# Patient Record
Sex: MALE | NOT HISPANIC OR LATINO | URBAN - METROPOLITAN AREA
[De-identification: names, ages, dates, MRNs, and addresses within clinical notes are randomized per-mention and may not be internally consistent; named-entity substitution may affect disease eponyms.]

---

## 2019-09-30 ENCOUNTER — IMPORTED ENCOUNTER (OUTPATIENT)
Dept: URBAN - METROPOLITAN AREA CLINIC 1 | Facility: CLINIC | Age: 64
End: 2019-09-30

## 2019-09-30 PROBLEM — H43.811: Noted: 2019-09-30

## 2019-09-30 PROBLEM — H01.001: Noted: 2019-09-30

## 2019-09-30 PROBLEM — H01.004: Noted: 2019-09-30

## 2019-09-30 PROBLEM — H25.13: Noted: 2019-09-30

## 2019-09-30 PROBLEM — H40.023: Noted: 2019-09-30

## 2019-09-30 PROBLEM — G43.909: Noted: 2019-09-30

## 2019-09-30 PROCEDURE — 92004 COMPRE OPH EXAM NEW PT 1/>: CPT

## 2019-09-30 PROCEDURE — 92250 FUNDUS PHOTOGRAPHY W/I&R: CPT

## 2019-09-30 PROCEDURE — 92015 DETERMINE REFRACTIVE STATE: CPT

## 2019-09-30 NOTE — PATIENT DISCUSSION
1.  Ocular Migraine -- Reassurance and explanation was given to patient. 2. PVD w/o Tear OD -- Condition discussed. No holes or tears found with today's dilated exam. RD precautions given. 3.  Glaucoma Suspect OU -- (CD 0.8/0.7) IOP stable. Negative Family Hx. Patient is considered high risk. Disc photo today showing Cupping OU. Condition was discussed with patient and patient understands. Will have patient return for baseline OCT in 6 months. 4. Anterior Blepharitis OU -- Daily Hot compresses and lid scrubs were recommended. 5. Nuclear Cataract OU -- Observe for now without intervention. The patient was advised to contact us if any change or worsening of visionReturn for an appointment in 6 months for a 10/OCT with Dr. Belle Wheatley.

## 2020-07-21 ENCOUNTER — IMPORTED ENCOUNTER (OUTPATIENT)
Dept: URBAN - METROPOLITAN AREA CLINIC 1 | Facility: CLINIC | Age: 65
End: 2020-07-21

## 2020-07-21 PROBLEM — H43.811: Noted: 2020-07-21

## 2020-07-21 PROBLEM — H40.013: Noted: 2020-07-21

## 2020-07-21 PROBLEM — H04.123: Noted: 2020-07-21

## 2020-07-21 PROBLEM — H25.813: Noted: 2020-07-21

## 2020-07-21 PROBLEM — H01.004: Noted: 2020-07-21

## 2020-07-21 PROBLEM — H16.143: Noted: 2020-07-21

## 2020-07-21 PROBLEM — H01.001: Noted: 2020-07-21

## 2020-07-21 PROCEDURE — 92014 COMPRE OPH EXAM EST PT 1/>: CPT

## 2020-07-21 PROCEDURE — 92133 CPTRZD OPH DX IMG PST SGM ON: CPT

## 2020-07-21 PROCEDURE — 92015 DETERMINE REFRACTIVE STATE: CPT

## 2020-07-21 NOTE — PATIENT DISCUSSION
1.  Glaucoma Suspect OU (CD 0.80/0.70): OCT essentially WNL OU. IOP stable. Negative family hx. Patient is considered Low Risk. Condition was discussed with patient and patient understands. Will continue to monitor patient for any progression in condition. Patient was advised to call us with any problems questions or concerns. 2.  MISTY w/ PEK OU- Advised to DC Visine. Recommend PF ATs QID OU routinely and AT Gel QHS OU 3. Cataract OU: Observe for now without intervention. The patient was advised to contact us if any change or worsening of vision4. Anterior Blepharitis OU - Daily Hot compresses and lid scrubs were recommended. 5. PVD w/o Tear OD - RD precautions. 6.  H/o Ocular Migraine 7. H/o Bell's Palsy  MRX for glasses given. Return for an appointment in 1 year 30/OCT with Dr. Beatrice Lewis.

## 2021-01-05 ENCOUNTER — IMPORTED ENCOUNTER (OUTPATIENT)
Dept: URBAN - METROPOLITAN AREA CLINIC 1 | Facility: CLINIC | Age: 66
End: 2021-01-05

## 2021-01-05 PROBLEM — H01.002: Noted: 2021-01-05

## 2021-01-05 PROBLEM — H01.005: Noted: 2021-01-05

## 2021-01-05 PROBLEM — H00.15: Noted: 2021-01-05

## 2021-01-05 PROBLEM — H01.001: Noted: 2021-01-05

## 2021-01-05 PROBLEM — H01.004: Noted: 2021-01-05

## 2021-01-05 PROCEDURE — 92012 INTRM OPH EXAM EST PATIENT: CPT

## 2021-01-05 NOTE — PATIENT DISCUSSION
1.  Blepharitis OU w/ cellulitis OS-recommend hot compresses TID for 5 minutes until seen. Begin doxycycline  mg BID (dispensed #60 erx'd)2. MISTY w/ PEK OU- Recommend cont PF ATs QID OU routinely and AT Gel QHS OU 3. Cataract OU: Observe for now without intervention. The patient was advised to contact us if any change or worsening of vision4. H/o Glaucoma Suspect (CD 0.80/0.70): IOP stable. Negative family hx. Patient is considered Low Risk. 5. H/o PVD w/o Tear OD 6. H/o Ocular Migraine 7. H/o Bell's Palsy Return for an appointment in 2 weeks 10 with Dr. Gelacio Mahoney.

## 2021-01-22 ENCOUNTER — IMPORTED ENCOUNTER (OUTPATIENT)
Dept: URBAN - METROPOLITAN AREA CLINIC 1 | Facility: CLINIC | Age: 66
End: 2021-01-22

## 2021-01-22 PROBLEM — H01.001: Noted: 2021-01-22

## 2021-01-22 PROBLEM — H01.004: Noted: 2021-01-22

## 2021-01-22 PROCEDURE — 92012 INTRM OPH EXAM EST PATIENT: CPT

## 2021-01-22 NOTE — PATIENT DISCUSSION
1.  Blepharitis OU w/ Cellulitis OS -- Much improved. D/c doxycycline. Cont Baby shampoo Qdaily and Hot compresses 2. MISTY w/ PEK OU- Recommend cont PF ATs QID OU routinely and AT Gel QHS OU 3. Cataract OU-  Observe for now without intervention. The patient was advised to contact us if any change or worsening of vision4. H/o Glaucoma Suspect (CD 0.80/0.70): IOP stable. Negative family hx. Patient is considered Low Risk. 5. H/o PVD w/o Tear OD 6. H/o Ocular Migraine 7. H/o Bell's Palsy Return for an appointment in As schedule with Dr. Brynn Kay.

## 2021-07-22 ENCOUNTER — IMPORTED ENCOUNTER (OUTPATIENT)
Dept: URBAN - METROPOLITAN AREA CLINIC 1 | Facility: CLINIC | Age: 66
End: 2021-07-22

## 2021-07-22 PROBLEM — H01.021: Noted: 2021-07-22

## 2021-07-22 PROBLEM — H16.143: Noted: 2021-07-22

## 2021-07-22 PROBLEM — H01.024: Noted: 2021-07-22

## 2021-07-22 PROBLEM — H04.123: Noted: 2021-07-22

## 2021-07-22 PROBLEM — H40.013: Noted: 2021-07-22

## 2021-07-22 PROBLEM — H01.001: Noted: 2021-07-22

## 2021-07-22 PROBLEM — H01.004: Noted: 2021-07-22

## 2021-07-22 PROBLEM — H25.813: Noted: 2021-07-22

## 2021-07-22 PROCEDURE — 92014 COMPRE OPH EXAM EST PT 1/>: CPT

## 2021-07-22 PROCEDURE — 92133 CPTRZD OPH DX IMG PST SGM ON: CPT

## 2021-07-22 NOTE — PATIENT DISCUSSION
1. Glaucoma Suspect OU (CD 0.8 0.75) IOP stable. OCT today WNL OU. Likely physiological cupping. Negative Family Hx. Patient is considered Low Risk. Condition was discussed with patient and patient understands. Will continue to monitor patient for any progression in condition. Patient was advised to call us with any problems questions or concerns. 2.  Cataract OU --  Observe for now without intervention. Will plan Glare testing OU next appt given patient is noticing glare at night. Patient to contact with any worsening of vision. 3.  Anterior Blepharitis OU -- Cont lid hygiene. Baby shampoo Qdaily and Hot compresses. 4. MISTY w/ PEK OU- Recommend cont PF ATs QID OU routinely and AT Gel QHS OU 5. PVD w/o Tear OD 6. H/o Ocular Migraine 7. H/o Bell's Palsy Return for an appointment 1 year for a 30/glare with Dr. Daphne Golden.

## 2022-01-11 NOTE — PATIENT DISCUSSION
PT CAN'T TOLERATE TAKING LATANOPROST QHS OU DUE TO UNCOMFORTABLE AND BURNING.  DISCUSED OPTION OF SLT WITH PT.  PT DESIRES SLT TO LOWER IOP INSTEAD OF DROP USE. [facility] & [facility] SLT OU.

## 2022-02-21 NOTE — PATIENT DISCUSSION
PT CAN'T TOLERATE TAKING LATANOPROST QHS OU DUE TO UNCOMFORTABLE AND BURNING.  DISCUSED OPTION OF SLT WITH PT.  PT DESIRES SLT TO LOWER IOP INSTEAD OF DROP USE. [address] SLT OU.

## 2022-02-21 NOTE — PROCEDURE NOTE: CLINICAL
PROCEDURE NOTE: SLT OU. Diagnosis: Glaucoma Suspect, Low Risk. Anesthesia: Topical. Prior to treatment, the risks/benefits/alternatives were discussed. The patient wished to proceed with procedure. Lens:  SLT laser lens with goniosol. Power: *mJ. Total applications: *. Application * degrees. Patient tolerated procedure well. There were no complications. Post-op instructions given. Post-op IOP = * mmHg. Edgard Lovell

## 2022-02-28 NOTE — PATIENT DISCUSSION
PT CAN'T TOLERATE TAKING LATANOPROST QHS OU DUE TO UNCOMFORTABLE AND BURNING.  DISCUSED OPTION OF SLT WITH PT.  PT DESIRES SLT TO LOWER IOP INSTEAD OF DROP USE. [facility] & [facility] SLT OU.

## 2022-03-04 NOTE — PATIENT DISCUSSION
PT CAN'T TOLERATE TAKING LATANOPROST QHS OU DUE TO UNCOMFORTABLE AND BURNING.  DISCUSED OPTION OF SLT WITH PT.  PT DESIRES SLT TO LOWER IOP INSTEAD OF DROP USE. [facility] & [facility] SLT OU.

## 2022-04-02 ASSESSMENT — VISUAL ACUITY
OS_SC: 20/20
OD_SC: 20/20-1
OD_CC: J1
OS_CC: J1-2
OS_CC: J1
OD_CC: J1-2
OS_SC: 20/20-1
OD_SC: 20/25+2
OD_SC: 20/25
OS_SC: 20/20
OS_SC: 20/20-1
OD_SC: 20/25
OD_SC: 20/25
OS_SC: 20/20-2

## 2022-04-02 ASSESSMENT — TONOMETRY
OD_IOP_MMHG: 17
OS_IOP_MMHG: 17
OS_IOP_MMHG: 16
OD_IOP_MMHG: 16
OS_IOP_MMHG: 17
OD_IOP_MMHG: 17
OD_IOP_MMHG: 16
OD_IOP_MMHG: 17
OS_IOP_MMHG: 16
OS_IOP_MMHG: 17

## 2022-07-07 NOTE — PATIENT DISCUSSION
PT CAN'T TOLERATE TAKING LATANOPROST QHS OU DUE TO UNCOMFORTABLE AND BURNING.  DISCUSED OPTION OF SLT WITH PT.  PT DESIRES SLT TO LOWER IOP INSTEAD OF DROP USE. [facility] & [facility] SLT OU.

## 2022-07-21 ENCOUNTER — COMPREHENSIVE EXAM (OUTPATIENT)
Dept: URBAN - METROPOLITAN AREA CLINIC 1 | Facility: CLINIC | Age: 67
End: 2022-07-21

## 2022-07-21 DIAGNOSIS — H04.123: ICD-10-CM

## 2022-07-21 DIAGNOSIS — H40.013: ICD-10-CM

## 2022-07-21 DIAGNOSIS — H01.021: ICD-10-CM

## 2022-07-21 DIAGNOSIS — H25.813: ICD-10-CM

## 2022-07-21 DIAGNOSIS — H16.143: ICD-10-CM

## 2022-07-21 DIAGNOSIS — H01.024: ICD-10-CM

## 2022-07-21 DIAGNOSIS — H43.811: ICD-10-CM

## 2022-07-21 PROCEDURE — 99214 OFFICE O/P EST MOD 30 MIN: CPT

## 2022-07-21 PROCEDURE — 92015 DETERMINE REFRACTIVE STATE: CPT

## 2022-07-21 ASSESSMENT — VISUAL ACUITY
OS_CC: 20/20
OD_CC: 20/20-2
OD_BAT: 20/50
OS_BAT: 20/50

## 2022-07-21 ASSESSMENT — TONOMETRY
OS_IOP_MMHG: 17
OD_IOP_MMHG: 17

## 2022-07-21 NOTE — PATIENT DISCUSSION
(CD 0.80/0.75) IOP stable. Past w/u negative. Patient is considered low risk. Condition was discussed with patient and patient understands. Will continue to monitor patient for any progression in condition. Patient was advised to call us with any problems, questions, or concerns.

## 2022-09-08 ENCOUNTER — PRE-OP/H&P (OUTPATIENT)
Dept: URBAN - METROPOLITAN AREA CLINIC 1 | Facility: CLINIC | Age: 67
End: 2022-09-08

## 2022-09-08 VITALS
SYSTOLIC BLOOD PRESSURE: 118 MMHG | HEIGHT: 73 IN | BODY MASS INDEX: 33.8 KG/M2 | WEIGHT: 255 LBS | DIASTOLIC BLOOD PRESSURE: 80 MMHG | HEART RATE: 68 BPM

## 2022-09-08 DIAGNOSIS — H25.813: ICD-10-CM

## 2022-09-08 PROCEDURE — 99499 UNLISTED E&M SERVICE: CPT

## 2022-09-08 PROCEDURE — 92136 OPHTHALMIC BIOMETRY: CPT

## 2022-09-08 PROCEDURE — 92025 CPTRIZED CORNEAL TOPOGRAPHY: CPT

## 2022-09-08 ASSESSMENT — KERATOMETRY
OD_K2POWER_DIOPTERS: 45.75
OS_AXISANGLE2_DEGREES: 162
OD_AXISANGLE_DEGREES: 042
OS_K1POWER_DIOPTERS: 45.00
OS_K2POWER_DIOPTERS: 45.25
OD_AXISANGLE2_DEGREES: 132
OS_AXISANGLE_DEGREES: 072
OD_K1POWER_DIOPTERS: 45.25

## 2022-09-08 ASSESSMENT — VISUAL ACUITY
OD_CC: 20/20-2
OS_CC: 20/20
OS_CC: J1
OD_BAT: 20/50
OD_CC: J1
OS_BAT: 20/50

## 2022-09-08 ASSESSMENT — TONOMETRY
OS_IOP_MMHG: 17
OD_IOP_MMHG: 17

## 2022-09-21 ENCOUNTER — SURGERY/PROCEDURE (OUTPATIENT)
Dept: URBAN - METROPOLITAN AREA SURGERY 1 | Facility: SURGERY | Age: 67
End: 2022-09-21

## 2022-09-21 DIAGNOSIS — H25.812: ICD-10-CM

## 2022-09-21 PROCEDURE — 66984 XCAPSL CTRC RMVL W/O ECP: CPT

## 2022-09-22 ENCOUNTER — POST-OP (OUTPATIENT)
Dept: URBAN - METROPOLITAN AREA CLINIC 1 | Facility: CLINIC | Age: 67
End: 2022-09-22

## 2022-09-22 DIAGNOSIS — Z96.1: ICD-10-CM

## 2022-09-22 PROCEDURE — 99024 POSTOP FOLLOW-UP VISIT: CPT

## 2022-09-22 ASSESSMENT — KERATOMETRY
OD_K1POWER_DIOPTERS: 45.25
OS_K1POWER_DIOPTERS: 45.00
OD_AXISANGLE2_DEGREES: 132
OD_K2POWER_DIOPTERS: 45.75
OD_AXISANGLE_DEGREES: 042
OS_AXISANGLE2_DEGREES: 162
OS_AXISANGLE_DEGREES: 072
OS_K2POWER_DIOPTERS: 45.25
OS_K2POWER_DIOPTERS: 45.25
OD_K1POWER_DIOPTERS: 45.25
OS_AXISANGLE2_DEGREES: 162
OD_K2POWER_DIOPTERS: 45.75
OS_K1POWER_DIOPTERS: 45.00
OD_AXISANGLE2_DEGREES: 132
OD_AXISANGLE_DEGREES: 042
OS_AXISANGLE_DEGREES: 072

## 2022-09-22 ASSESSMENT — VISUAL ACUITY
OS_SC: 20/60
OS_PH: 20/25

## 2022-09-22 ASSESSMENT — TONOMETRY: OS_IOP_MMHG: 27

## 2022-09-22 NOTE — PATIENT DISCUSSION
Use Pred Combo gtts through completion of PO gtt chart regimen/ Per our instructions given to patient.

## 2022-09-22 NOTE — PATIENT DISCUSSION
POD#1, CE/IOL OS (Standard), doing well. **Myopic Target. *IOP spike @ 27 OS. 1 gtt Combigan instilled prior to leaving.

## 2022-10-11 ENCOUNTER — POST OP/EVAL OF SECOND EYE (OUTPATIENT)
Dept: URBAN - METROPOLITAN AREA CLINIC 1 | Facility: CLINIC | Age: 67
End: 2022-10-11

## 2022-10-11 DIAGNOSIS — H25.811: ICD-10-CM

## 2022-10-11 DIAGNOSIS — Z96.1: ICD-10-CM

## 2022-10-11 PROCEDURE — 99024 POSTOP FOLLOW-UP VISIT: CPT

## 2022-10-11 PROCEDURE — 92025 CPTRIZED CORNEAL TOPOGRAPHY: CPT

## 2022-10-11 PROCEDURE — 92136 OPHTHALMIC BIOMETRY: CPT

## 2022-10-11 ASSESSMENT — KERATOMETRY
OD_K1POWER_DIOPTERS: 44.75
OS_AXISANGLE2_DEGREES: 168
OD_K2POWER_DIOPTERS: 45.00
OD_AXISANGLE2_DEGREES: 110
OD_AXISANGLE_DEGREES: 020
OS_AXISANGLE_DEGREES: 078
OS_K1POWER_DIOPTERS: 45.00
OS_K2POWER_DIOPTERS: 45.25

## 2022-10-11 ASSESSMENT — VISUAL ACUITY
OS_SC: 20/80
OD_SC: 20/250

## 2022-10-11 ASSESSMENT — TONOMETRY
OS_IOP_MMHG: 16
OD_IOP_MMHG: 16

## 2022-10-19 ENCOUNTER — SURGERY/PROCEDURE (OUTPATIENT)
Dept: URBAN - METROPOLITAN AREA SURGERY 1 | Facility: SURGERY | Age: 67
End: 2022-10-19

## 2022-10-19 DIAGNOSIS — H25.811: ICD-10-CM

## 2022-10-19 PROCEDURE — 66984 XCAPSL CTRC RMVL W/O ECP: CPT

## 2022-10-19 ASSESSMENT — KERATOMETRY
OD_K2POWER_DIOPTERS: 45.00
OD_AXISANGLE2_DEGREES: 110
OD_AXISANGLE_DEGREES: 020
OS_AXISANGLE_DEGREES: 078
OS_K2POWER_DIOPTERS: 45.25
OS_K1POWER_DIOPTERS: 45.00
OS_AXISANGLE2_DEGREES: 168
OD_K1POWER_DIOPTERS: 44.75

## 2022-10-20 ENCOUNTER — POST-OP (OUTPATIENT)
Dept: URBAN - METROPOLITAN AREA CLINIC 1 | Facility: CLINIC | Age: 67
End: 2022-10-20

## 2022-10-20 DIAGNOSIS — Z96.1: ICD-10-CM

## 2022-10-20 PROCEDURE — 99024 POSTOP FOLLOW-UP VISIT: CPT

## 2022-10-20 ASSESSMENT — KERATOMETRY
OD_K2POWER_DIOPTERS: 45.00
OD_AXISANGLE_DEGREES: 020
OD_AXISANGLE2_DEGREES: 110
OD_K1POWER_DIOPTERS: 44.75
OS_K1POWER_DIOPTERS: 45.00
OS_AXISANGLE2_DEGREES: 168
OS_K2POWER_DIOPTERS: 45.25
OS_AXISANGLE_DEGREES: 078

## 2022-10-20 ASSESSMENT — VISUAL ACUITY
OS_PH: 20/20
OD_SC: 20/50
OS_SC: 20/50
OD_PH: 20/20

## 2022-10-20 ASSESSMENT — TONOMETRY
OS_IOP_MMHG: 14
OD_IOP_MMHG: 30

## 2022-10-21 ENCOUNTER — POST-OP (OUTPATIENT)
Dept: URBAN - METROPOLITAN AREA CLINIC 1 | Facility: CLINIC | Age: 67
End: 2022-10-21

## 2022-10-21 DIAGNOSIS — Z96.1: ICD-10-CM

## 2022-10-21 PROCEDURE — 99024 POSTOP FOLLOW-UP VISIT: CPT

## 2022-10-21 ASSESSMENT — KERATOMETRY
OD_AXISANGLE_DEGREES: 020
OS_K2POWER_DIOPTERS: 45.25
OD_K1POWER_DIOPTERS: 44.75
OD_K2POWER_DIOPTERS: 45.00
OD_AXISANGLE2_DEGREES: 110
OS_AXISANGLE2_DEGREES: 168
OS_AXISANGLE_DEGREES: 078
OS_K1POWER_DIOPTERS: 45.00

## 2022-10-21 ASSESSMENT — TONOMETRY
OS_IOP_MMHG: 14
OD_IOP_MMHG: 15

## 2022-10-21 ASSESSMENT — VISUAL ACUITY
OS_SC: 20/40+2
OD_SC: 20/25
OS_PH: 20/20

## 2022-11-04 ENCOUNTER — POST-OP (OUTPATIENT)
Dept: URBAN - METROPOLITAN AREA CLINIC 1 | Facility: CLINIC | Age: 67
End: 2022-11-04

## 2022-11-04 DIAGNOSIS — Z96.1: ICD-10-CM

## 2022-11-04 PROCEDURE — 99024 POSTOP FOLLOW-UP VISIT: CPT

## 2022-11-04 ASSESSMENT — KERATOMETRY
OS_AXISANGLE_DEGREES: 078
OD_K2POWER_DIOPTERS: 45.00
OD_K1POWER_DIOPTERS: 44.75
OD_AXISANGLE_DEGREES: 020
OS_K2POWER_DIOPTERS: 45.25
OD_AXISANGLE2_DEGREES: 110
OS_AXISANGLE2_DEGREES: 168
OS_K1POWER_DIOPTERS: 45.00

## 2022-11-04 ASSESSMENT — TONOMETRY
OS_IOP_MMHG: 15
OD_IOP_MMHG: 15

## 2022-11-04 ASSESSMENT — VISUAL ACUITY
OD_SC: 20/40-2
OD_SC: J1
OS_SC: J1+
OS_SC: 20/60

## 2023-06-05 ENCOUNTER — COMPREHENSIVE EXAM (OUTPATIENT)
Dept: URBAN - METROPOLITAN AREA CLINIC 1 | Facility: CLINIC | Age: 68
End: 2023-06-05

## 2023-06-05 DIAGNOSIS — H40.013: ICD-10-CM

## 2023-06-05 DIAGNOSIS — H01.024: ICD-10-CM

## 2023-06-05 DIAGNOSIS — H04.123: ICD-10-CM

## 2023-06-05 DIAGNOSIS — H16.143: ICD-10-CM

## 2023-06-05 DIAGNOSIS — H01.021: ICD-10-CM

## 2023-06-05 DIAGNOSIS — H43.811: ICD-10-CM

## 2023-06-05 PROCEDURE — 92014 COMPRE OPH EXAM EST PT 1/>: CPT

## 2023-06-05 PROCEDURE — 92133 CPTRZD OPH DX IMG PST SGM ON: CPT

## 2023-06-05 ASSESSMENT — KERATOMETRY
OS_K1POWER_DIOPTERS: 45.00
OD_K2POWER_DIOPTERS: 45.00
OD_K1POWER_DIOPTERS: 44.75
OS_K2POWER_DIOPTERS: 45.25
OD_AXISANGLE_DEGREES: 020
OS_AXISANGLE2_DEGREES: 168
OS_AXISANGLE_DEGREES: 078
OD_AXISANGLE2_DEGREES: 110

## 2023-06-05 ASSESSMENT — TONOMETRY
OS_IOP_MMHG: 13
OD_IOP_MMHG: 13

## 2023-06-05 ASSESSMENT — VISUAL ACUITY
OD_SC: J1
OD_CC: 20/20
OS_SC: J1
OS_CC: 20/20

## 2024-04-23 ENCOUNTER — FOLLOW UP (OUTPATIENT)
Dept: URBAN - METROPOLITAN AREA CLINIC 1 | Facility: CLINIC | Age: 69
End: 2024-04-23

## 2024-04-23 DIAGNOSIS — H26.493: ICD-10-CM

## 2024-04-23 DIAGNOSIS — Z96.1: ICD-10-CM

## 2024-04-23 PROCEDURE — 92015 DETERMINE REFRACTIVE STATE: CPT

## 2024-04-23 PROCEDURE — 99213 OFFICE O/P EST LOW 20 MIN: CPT

## 2024-04-23 ASSESSMENT — KERATOMETRY
OS_AXISANGLE2_DEGREES: 168
OD_K2POWER_DIOPTERS: 45.00
OS_K1POWER_DIOPTERS: 45.00
OD_AXISANGLE2_DEGREES: 110
OS_K2POWER_DIOPTERS: 45.25
OD_K1POWER_DIOPTERS: 44.75
OD_AXISANGLE_DEGREES: 020
OS_AXISANGLE_DEGREES: 078

## 2024-04-23 ASSESSMENT — VISUAL ACUITY
OD_CC: 20/20-1
OD_SC: J1
OS_BAT: 20/50
OD_BAT: 20/40
OS_SC: J1
OS_CC: 20/20

## 2024-04-23 ASSESSMENT — TONOMETRY
OS_IOP_MMHG: 14
OD_IOP_MMHG: 12

## 2024-05-17 ENCOUNTER — CLINIC PROCEDURE ONLY (OUTPATIENT)
Dept: URBAN - METROPOLITAN AREA CLINIC 1 | Facility: CLINIC | Age: 69
End: 2024-05-17

## 2024-05-17 DIAGNOSIS — H26.493: ICD-10-CM

## 2024-05-17 DIAGNOSIS — Z96.1: ICD-10-CM

## 2024-05-17 PROCEDURE — 66821 AFTER CATARACT LASER SURGERY: CPT

## 2024-05-21 ASSESSMENT — KERATOMETRY
OD_K2POWER_DIOPTERS: 45.00
OS_AXISANGLE_DEGREES: 078
OS_K2POWER_DIOPTERS: 45.25
OS_AXISANGLE2_DEGREES: 168
OD_K1POWER_DIOPTERS: 44.75
OD_AXISANGLE2_DEGREES: 110
OS_K1POWER_DIOPTERS: 45.00
OD_AXISANGLE_DEGREES: 020

## 2024-05-31 ENCOUNTER — CLINIC PROCEDURE ONLY (OUTPATIENT)
Dept: URBAN - METROPOLITAN AREA CLINIC 1 | Facility: CLINIC | Age: 69
End: 2024-05-31

## 2024-05-31 DIAGNOSIS — H26.492: ICD-10-CM

## 2024-05-31 DIAGNOSIS — Z96.1: ICD-10-CM

## 2024-05-31 PROCEDURE — 66821 AFTER CATARACT LASER SURGERY: CPT | Mod: 79,LT

## 2024-05-31 ASSESSMENT — KERATOMETRY
OS_K1POWER_DIOPTERS: 45.00
OS_K2POWER_DIOPTERS: 45.25
OD_AXISANGLE_DEGREES: 020
OS_AXISANGLE2_DEGREES: 168
OS_AXISANGLE_DEGREES: 078
OD_K2POWER_DIOPTERS: 45.00
OD_AXISANGLE2_DEGREES: 110
OD_K1POWER_DIOPTERS: 44.75

## 2024-06-03 ENCOUNTER — POST-OP (OUTPATIENT)
Dept: URBAN - METROPOLITAN AREA CLINIC 1 | Facility: CLINIC | Age: 69
End: 2024-06-03

## 2024-06-03 DIAGNOSIS — Z98.890: ICD-10-CM

## 2024-06-03 DIAGNOSIS — Z96.1: ICD-10-CM

## 2024-06-03 ASSESSMENT — KERATOMETRY
OD_K1POWER_DIOPTERS: 44.75
OS_K1POWER_DIOPTERS: 45.00
OD_AXISANGLE2_DEGREES: 110
OS_AXISANGLE2_DEGREES: 168
OS_K2POWER_DIOPTERS: 45.25
OS_AXISANGLE_DEGREES: 078
OD_K2POWER_DIOPTERS: 45.00
OD_AXISANGLE_DEGREES: 020

## 2024-06-03 ASSESSMENT — VISUAL ACUITY
OS_CC: 20/20
OS_CC: J1+
OD_CC: 20/20
OD_CC: J1+

## 2024-06-03 ASSESSMENT — TONOMETRY
OD_IOP_MMHG: 12
OS_IOP_MMHG: 12

## 2025-01-27 ENCOUNTER — FOLLOW UP (OUTPATIENT)
Age: 70
End: 2025-01-27

## 2025-01-27 DIAGNOSIS — D23.122: ICD-10-CM

## 2025-01-27 DIAGNOSIS — H01.021: ICD-10-CM

## 2025-01-27 DIAGNOSIS — H01.024: ICD-10-CM

## 2025-01-27 DIAGNOSIS — H00.15: ICD-10-CM

## 2025-01-27 PROCEDURE — 99213 OFFICE O/P EST LOW 20 MIN: CPT

## 2025-04-03 ENCOUNTER — COMPREHENSIVE EXAM (OUTPATIENT)
Age: 70
End: 2025-04-03

## 2025-04-03 DIAGNOSIS — H04.123: ICD-10-CM

## 2025-04-03 DIAGNOSIS — H01.021: ICD-10-CM

## 2025-04-03 DIAGNOSIS — H16.143: ICD-10-CM

## 2025-04-03 DIAGNOSIS — H01.024: ICD-10-CM

## 2025-04-03 DIAGNOSIS — H40.013: ICD-10-CM

## 2025-04-03 DIAGNOSIS — Z96.1: ICD-10-CM

## 2025-04-03 DIAGNOSIS — H43.813: ICD-10-CM

## 2025-04-03 PROCEDURE — 99214 OFFICE O/P EST MOD 30 MIN: CPT

## 2025-04-03 PROCEDURE — 92015 DETERMINE REFRACTIVE STATE: CPT
